# Patient Record
Sex: MALE | Race: WHITE | NOT HISPANIC OR LATINO
[De-identification: names, ages, dates, MRNs, and addresses within clinical notes are randomized per-mention and may not be internally consistent; named-entity substitution may affect disease eponyms.]

---
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
## 2023-01-06 ENCOUNTER — APPOINTMENT (OUTPATIENT)
Dept: GASTROENTEROLOGY | Facility: CLINIC | Age: 18
End: 2023-01-06
Payer: COMMERCIAL

## 2023-01-06 ENCOUNTER — NON-APPOINTMENT (OUTPATIENT)
Age: 18
End: 2023-01-06

## 2023-01-06 VITALS
DIASTOLIC BLOOD PRESSURE: 64 MMHG | BODY MASS INDEX: 25.05 KG/M2 | OXYGEN SATURATION: 99 % | WEIGHT: 175 LBS | HEIGHT: 70 IN | SYSTOLIC BLOOD PRESSURE: 110 MMHG | TEMPERATURE: 97.3 F | HEART RATE: 85 BPM

## 2023-01-06 DIAGNOSIS — K64.9 UNSPECIFIED HEMORRHOIDS: ICD-10-CM

## 2023-01-06 DIAGNOSIS — K58.9 IRRITABLE BOWEL SYNDROME W/OUT DIARRHEA: ICD-10-CM

## 2023-01-06 PROCEDURE — 99214 OFFICE O/P EST MOD 30 MIN: CPT

## 2023-01-06 NOTE — HISTORY OF PRESENT ILLNESS
[de-identified] : Presents  for follow up.  Stools  are giacomo  predominately  loose ( 2-3 times daily).  No urgency / tenesmus / BRBPR. Denies nocturnal diarrhea. Also reports a lump in anal area for several years ( painless) \par \par Last evaluated 11/2021 for bowel irregularity ( intermittent  diarrhea  alternating with constipation). The  diarrhea was  self  limiting  and  generally no more than 1  episode per week. He  described the  constipation as a  bowel movement every 2-3  days.  His  GI issues dated  back sometime, but may be  more noticeable for the past 3-4  months prior  to last visit.  Admitted to increased stress at college and increased gas as well. Additionally does not tolerate milk based dairy products.  Working diagnosis was IBS\par Labs  ordered wire c/w Anika\par \par \par Denies  fever, chills, melena, hematemesis, BRBPR. Apparently had upper abdominal  / mild pressure for 2  weeks  ~  1 month ago.

## 2023-01-06 NOTE — CONSULT LETTER
[Dear  ___] : Dear  [unfilled], [Consult Letter:] : I had the pleasure of evaluating your patient, [unfilled]. [Please see my note below.] : Please see my note below. [Consult Closing:] : Thank you very much for allowing me to participate in the care of this patient.  If you have any questions, please do not hesitate to contact me. [Sincerely,] : Sincerely, [FreeTextEntry3] : Thai Galloway MD\par tel: [phone]\par fax: [phone]\par

## 2023-01-06 NOTE — PHYSICAL EXAM
[General Appearance - Alert] : alert [General Appearance - In No Acute Distress] : in no acute distress [Sclera] : the sclera and conjunctiva were normal [Outer Ear] : the ears and nose were normal in appearance [Neck Appearance] : the appearance of the neck was normal [] : no respiratory distress [Abdomen Soft] : soft [No CVA Tenderness] : no ~M costovertebral angle tenderness [Abnormal Walk] : normal gait [Skin Color & Pigmentation] : normal skin color and pigmentation [No Focal Deficits] : no focal deficits [Oriented To Time, Place, And Person] : oriented to person, place, and time [FreeTextEntry1] : Rectal exam ; skin tag / hemorrhoid

## 2023-01-06 NOTE — ASSESSMENT
[FreeTextEntry1] : 1. Probable IBS:  Currently diarrhea predominant.  Stool studies / trial of  Xifaxan. Follow up in 1  month..Colonoscopy if  sxs persist\par \par 2. Hemorrhoid:  Expectant  management\par \par \par follow up in 2  months\par \par

## 2023-03-03 NOTE — HISTORY OF PRESENT ILLNESS
[de-identified] : Presents  for follow up. \par \par  At last visit  2  months  ago...stools  were predominately  loose ( 2-3 times daily).  No urgency / tenesmus / BRBPR. Denies nocturnal diarrhea. Also reported a lump in anal area for several years ( painless) ..hemorrhoids on exam.  Stool studies requested ( not submitted).  Xifaxan ordered\par \par Evaluated 11/2021 for bowel irregularity ( intermittent  diarrhea  alternating with constipation). The  diarrhea was  self  limiting  and  generally no more than 1  episode per week. He  described the  constipation as a  bowel movement every 2-3  days.  His  GI issues dated  back sometime, but may be  more noticeable for the past 3-4  months prior  to last visit.  Admitted to increased stress at college and increased gas as well. Additionally does not tolerate milk based dairy products.  Working diagnosis was IBS\par Labs  ordered were c/w Anika\par \par \par Denies  fever, chills, melena, hematemesis, BRBPR. Apparently had upper abdominal  / mild pressure for 2  weeks  ~  1 month ago.

## 2023-03-03 NOTE — PHYSICAL EXAM
[General Appearance - Alert] : alert [General Appearance - In No Acute Distress] : in no acute distress [Sclera] : the sclera and conjunctiva were normal [Outer Ear] : the ears and nose were normal in appearance [Neck Appearance] : the appearance of the neck was normal [] : no respiratory distress [Abdomen Soft] : soft [FreeTextEntry1] : Rectal exam ; skin tag / hemorrhoid [No CVA Tenderness] : no ~M costovertebral angle tenderness [Abnormal Walk] : normal gait [Skin Color & Pigmentation] : normal skin color and pigmentation [No Focal Deficits] : no focal deficits [Oriented To Time, Place, And Person] : oriented to person, place, and time

## 2023-03-06 ENCOUNTER — APPOINTMENT (OUTPATIENT)
Dept: GASTROENTEROLOGY | Facility: CLINIC | Age: 18
End: 2023-03-06

## 2023-06-30 ENCOUNTER — NON-APPOINTMENT (OUTPATIENT)
Age: 18
End: 2023-06-30

## 2024-06-21 ENCOUNTER — APPOINTMENT (OUTPATIENT)
Dept: FAMILY MEDICINE | Facility: CLINIC | Age: 19
End: 2024-06-21
Payer: COMMERCIAL

## 2024-06-21 VITALS
HEART RATE: 92 BPM | BODY MASS INDEX: 26.48 KG/M2 | DIASTOLIC BLOOD PRESSURE: 80 MMHG | OXYGEN SATURATION: 98 % | WEIGHT: 185 LBS | HEIGHT: 70 IN | SYSTOLIC BLOOD PRESSURE: 110 MMHG

## 2024-06-21 DIAGNOSIS — E80.4 GILBERT SYNDROME: ICD-10-CM

## 2024-06-21 DIAGNOSIS — Z83.79 FAMILY HISTORY OF OTHER DISEASES OF THE DIGESTIVE SYSTEM: ICD-10-CM

## 2024-06-21 DIAGNOSIS — R19.4 CHANGE IN BOWEL HABIT: ICD-10-CM

## 2024-06-21 DIAGNOSIS — Z86.59 PERSONAL HISTORY OF OTHER MENTAL AND BEHAVIORAL DISORDERS: ICD-10-CM

## 2024-06-21 DIAGNOSIS — Z87.898 PERSONAL HISTORY OF OTHER SPECIFIED CONDITIONS: ICD-10-CM

## 2024-06-21 DIAGNOSIS — Z76.89 PERSONS ENCOUNTERING HEALTH SERVICES IN OTHER SPECIFIED CIRCUMSTANCES: ICD-10-CM

## 2024-06-21 DIAGNOSIS — Z23 ENCOUNTER FOR IMMUNIZATION: ICD-10-CM

## 2024-06-21 DIAGNOSIS — Z83.3 FAMILY HISTORY OF DIABETES MELLITUS: ICD-10-CM

## 2024-06-21 PROCEDURE — 99204 OFFICE O/P NEW MOD 45 MIN: CPT

## 2024-06-21 RX ORDER — RIFAXIMIN 550 MG/1
550 TABLET ORAL
Qty: 42 | Refills: 3 | Status: COMPLETED | COMMUNITY
Start: 2023-01-06 | End: 2024-06-21

## 2024-06-21 NOTE — HISTORY OF PRESENT ILLNESS
[de-identified] : 19 year old male here to establish care with new PCP. Previously managed by Pediatrician Dr Kelvin Petit at [facility], but now 19 years old so needs primary care.  Had annual exam on 3/11/2024 and brings in prior records to review.  History of asthma, OCD, lactose intolerance, food allergies, acne, Gilbert's syndrome.  Vaccine record: Received all childhood immunizations. Last vaccine was Tdap done 3/11/2024.   Following with dermatology: Indy Johnson at [facility]. Prescribed Accutane but has not started yet.  Previously seen by GI. Dr. Galloway for diarrhea, thought possibly IBS. Labs and stool testing reassuring and minimal symptoms at this time.

## 2024-06-21 NOTE — HEALTH RISK ASSESSMENT
[Good] : ~his/her~  mood as  good [Monthly or less (1 pt)] : Monthly or less (1 point) [1 or 2 (0 pts)] : 1 or 2 (0 points) [Yes] : In the past 12 months have you used drugs other than those required for medical reasons? Yes [No falls in past year] : Patient reported no falls in the past year [0] : 2) Feeling down, depressed, or hopeless: Not at all (0) [Fully functional (bathing, dressing, toileting, transferring, walking, feeding)] : Fully functional (bathing, dressing, toileting, transferring, walking, feeding) [Fully functional (using the telephone, shopping, preparing meals, housekeeping, doing laundry, using] : Fully functional and needs no help or supervision to perform IADLs (using the telephone, shopping, preparing meals, housekeeping, doing laundry, using transportation, managing medications and managing finances) [Never] : Never [With Family] : lives with family [Employed] : employed [College] : College [FreeTextEntry1] : None [Audit-CScore] : 1 [de-identified] : smoking marijuana [de-identified] : Not on a routine, goes on tredmill [de-identified] : Lactose intolerance, might have IBS. 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

## 2024-06-21 NOTE — ASSESSMENT
[FreeTextEntry1] : 19 year old male here to establish care.  I have reviewed the labs with patient which were done at [place] on 6/3/24.  Labs show normal complete blood count, elevated total bilirubin 2.4, normal GGT and LFTs. Total cholesterol 136, triglyceride 54, HDL 58, LDL 67.  I have provided patient education regarding Gilbert syndrome as asymptomatic elevation in total bilirubin levels.  Reviewed gallbladder symptoms and reasons to follow up.  Vaccines up to date. Reviewed healthy lifestyle habits. No contraindications to start Accutane therapy per dermatology recommendations.  Follow up 1 year or sooner as needed. All questions answered, patient expressed understanding, and in agreement with plan.

## 2025-02-18 ENCOUNTER — NON-APPOINTMENT (OUTPATIENT)
Age: 20
End: 2025-02-18